# Patient Record
Sex: MALE | Race: BLACK OR AFRICAN AMERICAN
[De-identification: names, ages, dates, MRNs, and addresses within clinical notes are randomized per-mention and may not be internally consistent; named-entity substitution may affect disease eponyms.]

---

## 2019-10-06 ENCOUNTER — HOSPITAL ENCOUNTER (EMERGENCY)
Dept: HOSPITAL 35 - ER | Age: 29
Discharge: HOME | End: 2019-10-06
Payer: COMMERCIAL

## 2019-10-06 VITALS — HEIGHT: 71 IN | BODY MASS INDEX: 28 KG/M2 | WEIGHT: 200 LBS

## 2019-10-06 VITALS — SYSTOLIC BLOOD PRESSURE: 152 MMHG | DIASTOLIC BLOOD PRESSURE: 88 MMHG

## 2019-10-06 DIAGNOSIS — M79.5: Primary | ICD-10-CM

## 2019-10-06 DIAGNOSIS — K21.9: ICD-10-CM

## 2019-10-06 DIAGNOSIS — R50.9: ICD-10-CM

## 2019-11-06 ENCOUNTER — HOSPITAL ENCOUNTER (EMERGENCY)
Dept: HOSPITAL 35 - ER | Age: 29
Discharge: HOME | End: 2019-11-06
Payer: COMMERCIAL

## 2019-11-06 VITALS — SYSTOLIC BLOOD PRESSURE: 130 MMHG | DIASTOLIC BLOOD PRESSURE: 89 MMHG

## 2019-11-06 VITALS — BODY MASS INDEX: 29.4 KG/M2 | HEIGHT: 71 IN | WEIGHT: 210.01 LBS

## 2019-11-06 DIAGNOSIS — K12.0: ICD-10-CM

## 2019-11-06 DIAGNOSIS — K21.9: ICD-10-CM

## 2019-11-06 DIAGNOSIS — R07.9: Primary | ICD-10-CM

## 2019-11-06 NOTE — EKG
[address]  [address]
Phone:  [phone]                    ELECTROCARDIOGRAM REPORT      
_______________________________________________________________________________
 
Name:       TERESO BAL JR     Room #:                     PRE ER  
M.R.#:      [id number]     Account #:      [id number]  
Admission:              Attend Phys:                          
Discharge:              Date of Birth:  [birth year]  
                                                          Report #: [id number]
   [id number]
_______________________________________________________________________________
THIS REPORT FOR:   //name//                          
 
                         [facility] ED
                                       
Test Date:    2019               Test Time:    11:47:53
Pat Name:     TERESO MALLY          Department:   
Patient ID:   [id number]            Room:          
Gender:       M                        Technician:   mary
:          1990               Requested By: Cecil Lewis
Order Number: [id number] MD:     
                                 Measurements
Intervals                              Axis          
Rate:         74                       P:            14
CT:           180                      QRS:          20
QRSD:         91                       T:            33
QT:           369                                    
QTc:          410                                    
                           Interpretive Statements
Sinus rhythm
ST elev, probable normal early repol pattern
No previous ECG available for comparison
[email or web address]
 
 
 
 
 
 
 
 
 
 
 
 
 
 
 
 
 
 
 
 
 
                         
   By:                               
                   
D: 197                           _____________________________________
T: 19 114                           Epiphany MD Yoly           /EPI

## 2019-11-08 NOTE — EKG
[place] [place] [address] [facility] [place]  [address]
Phone:  [phone]                    ELECTROCARDIOGRAM REPORT      
_______________________________________________________________________________
 
Name:       MALLYTERESOJAILYN DOWD      [id number]     Account #:      [id number]  
Admission:  19    Attend Phys:                          
Discharge:  19    Date of Birth:  [birth year]  
                                                          Report #: [id number]
   [id number]
_______________________________________________________________________________
THIS REPORT FOR:   //name//                          
 
                         [facility] ED
                                       
Test Date:    2019               Test Time:    11:47:53
Pat Name:     TERESO BAL          Department:   
Patient ID:   [id number]            Room:          
Gender:       M                        Technician:   mary
:          1990               Requested By: Cecil Lewis
Order Number: [id number] MD:   Cash Montelongo
                                 Measurements
Intervals                              Axis          
Rate:         74                       P:            14
CT:           180                      QRS:          20
QRSD:         91                       T:            33
QT:           369                                    
QTc:          410                                    
                           Interpretive Statements
Sinus rhythm
ST elev, probable normal early repol pattern
No previous ECG available for comparison
 
Electronically Signed On 2019 12:12:51 CST by Cash Montelongo
[email or web address]
 
 
 
 
 
 
 
 
 
 
 
 
 
 
 
 
 
 
 
  <ELECTRONICALLY SIGNED>
   By: Cash Montelongo MD               
  19     1212
D: 19 1147                           _____________________________________
T: 19 1147                           Cash Montelongo MD                 /EPI

## 2020-01-06 ENCOUNTER — HOSPITAL ENCOUNTER (EMERGENCY)
Dept: HOSPITAL 35 - ER | Age: 30
Discharge: HOME | End: 2020-01-06
Payer: COMMERCIAL

## 2020-01-06 VITALS — SYSTOLIC BLOOD PRESSURE: 135 MMHG | DIASTOLIC BLOOD PRESSURE: 100 MMHG

## 2020-01-06 VITALS — BODY MASS INDEX: 29.4 KG/M2 | WEIGHT: 210.01 LBS | HEIGHT: 71 IN

## 2020-01-06 DIAGNOSIS — R10.84: Primary | ICD-10-CM

## 2020-01-06 DIAGNOSIS — R11.2: ICD-10-CM

## 2020-01-06 LAB
ALBUMIN SERPL-MCNC: 3.7 G/DL (ref 3.4–5)
ALT SERPL-CCNC: 42 U/L (ref 30–65)
ANION GAP SERPL CALC-SCNC: 3 MMOL/L (ref 7–16)
AST SERPL-CCNC: 41 U/L (ref 15–37)
BASOPHILS NFR BLD AUTO: 0 % (ref 0–2)
BILIRUB SERPL-MCNC: 0.7 MG/DL
BILIRUB UR-MCNC: NEGATIVE MG/DL
BUN SERPL-MCNC: 11 MG/DL (ref 7–18)
CALCIUM SERPL-MCNC: 9.2 MG/DL (ref 8.5–10.1)
CHLORIDE SERPL-SCNC: 104 MMOL/L (ref 98–107)
CO2 SERPL-SCNC: 33 MMOL/L (ref 21–32)
COLOR UR: YELLOW
CREAT SERPL-MCNC: 1.4 MG/DL (ref 0.7–1.3)
EOSINOPHIL NFR BLD: 0 % (ref 0–3)
ERYTHROCYTE [DISTWIDTH] IN BLOOD BY AUTOMATED COUNT: 12.6 % (ref 10.5–14.5)
GLUCOSE SERPL-MCNC: 94 MG/DL (ref 74–106)
GRANULOCYTES NFR BLD MANUAL: 49 % (ref 36–66)
HCT VFR BLD CALC: 45.3 % (ref 42–52)
HGB BLD-MCNC: 15 GM/DL (ref 14–18)
KETONES UR STRIP-MCNC: NEGATIVE MG/DL
LIPASE: 173 U/L (ref 73–393)
LYMPHOCYTES NFR BLD AUTO: 35 % (ref 24–44)
MCH RBC QN AUTO: 30.9 PG (ref 26–34)
MCHC RBC AUTO-ENTMCNC: 33.2 G/DL (ref 28–37)
MCV RBC: 93.3 FL (ref 80–100)
MONOCYTES NFR BLD: 16 % (ref 1–8)
NEUTROPHILS # BLD: 1.5 THOU/UL (ref 1.4–8.2)
NEUTS BAND NFR BLD: 0 % (ref 0–8)
PLATELET # BLD: 171 THOU/UL (ref 150–400)
POTASSIUM SERPL-SCNC: 3.9 MMOL/L (ref 3.5–5.1)
PROT SERPL-MCNC: 7.6 G/DL (ref 6.4–8.2)
RBC # BLD AUTO: 4.86 MIL/UL (ref 4.5–6)
RBC # UR STRIP: NEGATIVE /UL
RBC MORPH BLD: NORMAL
SODIUM SERPL-SCNC: 140 MMOL/L (ref 136–145)
SP GR UR STRIP: >= 1.03 (ref 1–1.03)
URINE CLARITY: CLEAR
URINE GLUCOSE-RANDOM*: NEGATIVE
URINE LEUKOCYTES-REFLEX: NEGATIVE
URINE NITRITE-REFLEX: NEGATIVE
URINE PROTEIN (DIPSTICK): (no result)
UROBILINOGEN UR STRIP-ACNC: 1 E.U./DL (ref 0.2–1)
WBC # BLD AUTO: 3 THOU/UL (ref 4–11)